# Patient Record
(demographics unavailable — no encounter records)

---

## 2025-06-24 NOTE — HISTORY OF PRESENT ILLNESS
[Born at ___ Wks Gestation] : The patient was born at [unfilled] weeks gestation [C/S] : via  section [[place]] : at [facility] [BW: _____] : weight of [unfilled] [None] : There are no risk factors [Breast milk] : breast milk [Formula ___ oz/feed] : [unfilled] oz of formula per feed [Frequency of stools: ___] : Frequency of stools: [unfilled]  stools [per day] : per day. [Dark green] : dark green [In Bassinet/Crib] : sleeps in bassinet/crib [On back] : sleeps on back [Pacifier] : Uses pacifier [No] : No cigarette smoke exposure [Water heater temperature set at <120 degrees F] : Water heater temperature set at <120 degrees F [Rear facing car seat in back seat] : Rear facing car seat in back seat [Carbon Monoxide Detectors] : Carbon monoxide detectors at home [Smoke Detectors] : Smoke detectors at home. [Hepatitis B Vaccine Given] : Hepatitis B vaccine given [NO] : No [C/S Indication: ____] : ( [unfilled] ) [Length: _____] : length of [unfilled] [HC: _____] : head circumference of [unfilled] [DW: _____] : Discharge weight was [unfilled] [Age: ___] : [unfilled] year old mother [G: ___] : G [unfilled] [Rubella (Immune)] : Rubella immune [VDRL/RPR (Reactive)] : VDRL/RPR reactive [MBT: ____] : MBT - [unfilled] [Formula ___ oz in 24hrs] : [unfilled] oz of formula in 24 hours [Normal] : Normal [___ voids per day] : [unfilled] voids per day [Vitamins ___] : Patient takes [unfilled] vitamins daily [HepBsAG] : HepBsAg negative [HIV] : HIV negative [HepC] : Hepatitis C negative [GBS] : GBS negative [] : negative [FreeTextEntry9] : b+ [Co-sleeping] : no co-sleeping [Loose bedding, pillow, toys, and/or bumpers in crib] : no loose bedding, pillow, toys, and/or bumpers in crib [Exposure to electronic nicotine delivery system] : No exposure to electronic nicotine delivery system [Nirsevimab Given] : Nirsevimab not given [de-identified] : vianney supplement

## 2025-06-24 NOTE — DISCUSSION/SUMMARY
[Normal Growth] : growth [Normal Development] : developmental [No Elimination Concerns] : elimination [Continue Regimen] : feeding [No Skin Concerns] : skin [Normal Sleep Pattern] : sleep [None] : no known medical problems [Anticipatory Guidance Given] : Anticipatory guidance addressed as per the history of present illness section [No Vaccines] : no vaccines needed [No Medications] : ~He/She~ is not on any medications [Parent/Guardian] : Parent/Guardian [ Transition] :  transition [ Care] :  care [Nutritional Adequacy] : nutritional adequacy [Parental Well-Being] : parental well-being [Safety] : safety [Hepatitis B In Hospital] : Hepatitis B administered while in the hospital [FreeTextEntry1] : Recommend exclusive breastfeeding, 8-12 feedings per day. Mother should continue prenatal vitamins and avoid alcohol. If formula is needed, recommend iron-fortified formulations every 2-3 hrs. When in car, patient should be in rear-facing car seat in back seat. Air dry umbillical stump. Put baby to sleep on back, in own crib with no loose or soft bedding. Limit baby's exposure to others, especially those with fever or unknown vaccine status. Parents express understanding of risks of not obtaining age-appropriate vaccines. trans bili 0  f/u tuesday for weight check

## 2025-06-24 NOTE — PHYSICAL EXAM
[Alert] : alert [Normocephalic] : normocephalic [Flat Open Anterior Snow] : flat open anterior fontanelle [PERRL] : PERRL [Red Reflex Bilateral] : red reflex bilateral [Normally Placed Ears] : normally placed ears [Auricles Well Formed] : auricles well formed [Clear Tympanic membranes] : clear tympanic membranes [Light reflex present] : light reflex present [Bony structures visible] : bony structures visible [Patent Auditory Canal] : patent auditory canal [Nares Patent] : nares patent [Palate Intact] : palate intact [Uvula Midline] : uvula midline [Supple, full passive range of motion] : supple, full passive range of motion [Symmetric Chest Rise] : symmetric chest rise [Clear to Auscultation Bilaterally] : clear to auscultation bilaterally [Regular Rate and Rhythm] : regular rate and rhythm [S1, S2 present] : S1, S2 present [+2 Femoral Pulses] : +2 femoral pulses [Soft] : soft [Bowel Sounds] : bowel sounds present [Umbilical Stump Dry, Clean, Intact] : umbilical stump dry, clean, intact [Normal external genitalia] : normal external genitalia [Patent Vagina] : patent vagina [Patent] : patent [Normally Placed] : normally placed [No Abnormal Lymph Nodes Palpated] : no abnormal lymph nodes palpated [Symmetric Flexed Extremities] : symmetric flexed extremities [Startle Reflex] : startle reflex present [Suck Reflex] : suck reflex present [Rooting] : rooting reflex present [Palmar Grasp] : palmar grasp present [Plantar Grasp] : plantar reflex present [Symmetric Saima] : symmetric [place] [Acute Distress] : no acute distress [Icteric sclera] : nonicteric sclera [Discharge] : no discharge [Palpable Masses] : no palpable masses [Murmurs] : no murmurs [Tender] : nontender [Distended] : not distended [Hepatomegaly] : no hepatomegaly [Splenomegaly] : no splenomegaly [Clitoromegaly] : no clitoromegaly [Marcus-Ortolani] : negative Marcus-Ortolani [Spinal Dimple] : no spinal dimple [Tuft of Hair] : no tuft of hair [Jaundice] : not jaundice

## 2025-07-01 NOTE — HISTORY OF PRESENT ILLNESS
[FreeTextEntry6] : Pt is here for a weight check. combo feeding, taking 1.5- 2 oz of expressed bmand formula formula 3 times at night, all day breastfeeding  6 wet diapers daily 5 bms yellow and seedy  umbilical cord in place

## 2025-07-01 NOTE — DISCUSSION/SUMMARY
[FreeTextEntry1] : appropriate weight gain continue feeding formula and/or expressed BM every 2-3 hours  monitor wet diapers  Umbillical granuloma cauterized today in office. Silver nitrate will cause temporary darkening of umbillicus. Procedure well tolerated. If umbillicus continues to bleed or have discharge contact office. f/u in 2 weeks for well

## 2025-07-10 NOTE — PHYSICAL EXAM
[Tenderness with Palpation] : no tenderness with palpation [Guarding] : no guarding [Splenomegaly] : no splenomegaly [Hepatomegaly] : no hepatomegaly [Palmer: ____] : Palmer [unfilled] [Normal External Genitalia] : normal external genitalia [Negative Ortalani/Marcus] : positive Ortalani/Marcus [Sacral Dimple] : no sacral dimple [Tuft of Hair] : no tuft of hair [Straight] : straight [NL] : warm, clear

## 2025-07-10 NOTE — HISTORY OF PRESENT ILLNESS
[de-identified] : Frequent stools [FreeTextEntry6] : Taking pumped breast milk 2-3 oz every 2-3 hours. Taking Lupe formula 6-7 oz per day. Has had 11 yellow watery stools in the past 24 hours.  Gaining weight well.

## 2025-07-10 NOTE — HISTORY OF PRESENT ILLNESS
[de-identified] : Frequent stools [FreeTextEntry6] : Taking pumped breast milk 2-3 oz every 2-3 hours. Taking Lupe formula 6-7 oz per day. Has had 11 yellow watery stools in the past 24 hours.  Gaining weight well.

## 2025-07-10 NOTE — DISCUSSION/SUMMARY
[FreeTextEntry1] : Reassurance provided.   Infant may stool after every feed.  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  Obtain B/L hip US for left hip click

## 2025-07-10 NOTE — HISTORY OF PRESENT ILLNESS
[de-identified] : Frequent stools [FreeTextEntry6] : Taking pumped breast milk 2-3 oz every 2-3 hours. Taking Lupe formula 6-7 oz per day. Has had 11 yellow watery stools in the past 24 hours.  Gaining weight well.

## 2025-07-22 NOTE — PHYSICAL EXAM

## 2025-07-22 NOTE — DEVELOPMENTAL MILESTONES
[Passed] : passed [Normal Development] : Normal Development [Calms when picked up or spoken to] : calms when picked up or spoken to [Looks briefly at objects] : looks briefly at objects [Alerts to unexpected sound] : alerts to unexpected sound [Makes brief short vowel sounds] : makes brief short vowel sounds [Holds chin up in prone] : holds chin up in prone [Holds fingers more open at rest] : holds fingers more open at rest danger to self; mental illness expected to respond to inpatient care

## 2025-07-22 NOTE — HISTORY OF PRESENT ILLNESS
[Parents] : parents [Breast milk] : breast milk [Formula ___ oz in 24hrs] : [unfilled] oz of formula in 24 hours [Vitamins ___] : Patient takes [unfilled] vitamins daily [Normal] : Normal [Frequency of stools: ___] : Frequency of stools: [unfilled]  stools [per day] : per day. [Yellow] : yellow [In Bassinet/Crib] : sleeps in bassinet/crib [On back] : sleeps on back [Pacifier use] : Pacifier use [No] : No cigarette smoke exposure [Rear facing car seat in back seat] : Rear facing car seat in back seat [Smoke Detectors] : Smoke detectors at home. [Formula ___ oz/feed] : [unfilled] oz of formula per feed [Water heater temperature set at <120 degrees F] : Water heater temperature set at <120 degrees F [NO] : No [Co-sleeping] : no co-sleeping [Loose bedding, pillow, toys, and/or bumpers in crib] : no loose bedding, pillow, toys, and/or bumpers in crib [At risk for exposure to TB] : Not at risk for exposure to Tuberculosis

## 2025-07-22 NOTE — DISCUSSION/SUMMARY
[Normal Growth] : growth [Normal Development] : development  [No Elimination Concerns] : elimination [Continue Regimen] : feeding [No Skin Concerns] : skin [Normal Sleep Pattern] : sleep [None] : no medical problems [Anticipatory Guidance Given] : Anticipatory guidance addressed as per the history of present illness section [Parental Well-Being] : parental well-being [Family Adjustment] : family adjustment [Feeding Routines] : feeding routines [Infant Adjustment] : infant adjustment [Safety] : safety [Age Approp Vaccines] : Age appropriate vaccines administered [No Medications] : ~He/She~ is not on any medications [Parent/Guardian] : Parent/Guardian [] : The components of the vaccine(s) to be administered today are listed in the plan of care. The disease(s) for which the vaccine(s) are intended to prevent and the risks have been discussed with the caretaker.  The risks are also included in the appropriate vaccination information statements which have been provided to the patient's caregiver.  The caregiver has given consent to vaccinate. [FreeTextEntry1] : Recommend exclusive breastfeeding, 8-12 feedings per day. Mother should continue prenatal vitamins and avoid alcohol. If formula is needed, recommend iron-fortified formulations, 2-4 oz every 2-3 hrs. When in car, patient should be in rear-facing car seat in back seat. Put baby to sleep on back, in own crib with no loose or soft bedding. Help baby to develop sleep and feeding routines. May offer pacifier if needed. Start tummy time when awake. Limit baby's exposure to others, especially those with fever or unknown vaccine status. Parents counseled to call if rectal temperature >100.4 degrees F.